# Patient Record
Sex: MALE | Race: WHITE | NOT HISPANIC OR LATINO | Employment: FULL TIME | URBAN - METROPOLITAN AREA
[De-identification: names, ages, dates, MRNs, and addresses within clinical notes are randomized per-mention and may not be internally consistent; named-entity substitution may affect disease eponyms.]

---

## 2023-09-15 ENCOUNTER — HOSPITAL ENCOUNTER (OUTPATIENT)
Facility: CLINIC | Age: 43
Setting detail: OUTPATIENT SURGERY
End: 2023-09-15
Attending: FAMILY MEDICINE | Admitting: FAMILY MEDICINE
Payer: COMMERCIAL

## 2023-10-26 RX ORDER — CLONAZEPAM 0.5 MG/1
0.5 TABLET ORAL 2 TIMES DAILY PRN
COMMUNITY
Start: 2023-10-15

## 2023-10-27 ENCOUNTER — HOSPITAL ENCOUNTER (OUTPATIENT)
Facility: CLINIC | Age: 43
Setting detail: OUTPATIENT SURGERY
Discharge: HOME | End: 2023-10-27
Attending: FAMILY MEDICINE | Admitting: FAMILY MEDICINE
Payer: COMMERCIAL

## 2023-10-27 VITALS
TEMPERATURE: 96.8 F | HEIGHT: 69 IN | OXYGEN SATURATION: 95 % | WEIGHT: 172.84 LBS | SYSTOLIC BLOOD PRESSURE: 128 MMHG | RESPIRATION RATE: 16 BRPM | DIASTOLIC BLOOD PRESSURE: 70 MMHG | BODY MASS INDEX: 25.6 KG/M2 | HEART RATE: 61 BPM

## 2023-10-27 DIAGNOSIS — M77.11 LATERAL EPICONDYLITIS, RIGHT ELBOW: Primary | ICD-10-CM

## 2023-10-27 PROCEDURE — 76942 ECHO GUIDE FOR BIOPSY: CPT | Performed by: FAMILY MEDICINE

## 2023-10-27 PROCEDURE — 7100000009 HC PHASE TWO TIME - INITIAL BASE CHARGE: Performed by: FAMILY MEDICINE

## 2023-10-27 PROCEDURE — 2720000007 HC OR 272 NO HCPCS: Performed by: FAMILY MEDICINE

## 2023-10-27 PROCEDURE — 3600000003 HC OR TIME - INITIAL BASE CHARGE - PROCEDURE LEVEL THREE: Performed by: FAMILY MEDICINE

## 2023-10-27 PROCEDURE — 3600000008 HC OR TIME - EACH INCREMENTAL 1 MINUTE - PROCEDURE LEVEL THREE: Performed by: FAMILY MEDICINE

## 2023-10-27 PROCEDURE — 2500000005 HC RX 250 GENERAL PHARMACY W/O HCPCS: Performed by: FAMILY MEDICINE

## 2023-10-27 PROCEDURE — 7100000010 HC PHASE TWO TIME - EACH INCREMENTAL 1 MINUTE: Performed by: FAMILY MEDICINE

## 2023-10-27 PROCEDURE — 24357 REPAIR ELBOW PERC: CPT | Performed by: FAMILY MEDICINE

## 2023-10-27 PROCEDURE — 2500000004 HC RX 250 GENERAL PHARMACY W/ HCPCS (ALT 636 FOR OP/ED): Performed by: FAMILY MEDICINE

## 2023-10-27 PROCEDURE — A4217 STERILE WATER/SALINE, 500 ML: HCPCS | Performed by: FAMILY MEDICINE

## 2023-10-27 RX ORDER — SODIUM CHLORIDE 0.9 G/100ML
IRRIGANT IRRIGATION AS NEEDED
Status: DISCONTINUED | OUTPATIENT
Start: 2023-10-27 | End: 2023-10-27 | Stop reason: HOSPADM

## 2023-10-27 RX ORDER — BUPIVACAINE HCL/EPINEPHRINE 0.25-.0005
VIAL (ML) INJECTION AS NEEDED
Status: DISCONTINUED | OUTPATIENT
Start: 2023-10-27 | End: 2023-10-27 | Stop reason: HOSPADM

## 2023-10-27 ASSESSMENT — PAIN SCALES - GENERAL
PAINLEVEL_OUTOF10: 4
PAINLEVEL_OUTOF10: 0 - NO PAIN

## 2023-10-27 ASSESSMENT — COLUMBIA-SUICIDE SEVERITY RATING SCALE - C-SSRS: 1. IN THE PAST MONTH, HAVE YOU WISHED YOU WERE DEAD OR WISHED YOU COULD GO TO SLEEP AND NOT WAKE UP?: NO

## 2023-10-27 ASSESSMENT — PAIN - FUNCTIONAL ASSESSMENT
PAIN_FUNCTIONAL_ASSESSMENT: 0-10
PAIN_FUNCTIONAL_ASSESSMENT: 0-10

## 2023-10-27 NOTE — OP NOTE
ULTRASOUND GUIDED PERCUTANEOUS NEEDLE TENOTOMY OF THE RIGHT ELBOW (R) Operative Note     Date: 10/27/2023  OR Location: [facility] OR    Name: Cash Segura, : 1980, Age: 43 y.o., MRN: [id number], Sex: male    Diagnosis  Pre-op Diagnosis     * Lateral epicondylitis, right elbow [M77.11] Post-op Diagnosis     * Lateral epicondylitis, right elbow [M77.11]     Procedures  ULTRASOUND GUIDED PERCUTANEOUS NEEDLE TENOTOMY OF THE RIGHT ELBOW  15202 - LA TENOTOMY ELBOW LATERAL/MEDIAL PERCUTANEOUS      Surgeons      * Isma Smith - Primary    Resident/Fellow/Other Assistant:  Surgeon(s) and Role:    Procedure Summary  Anesthesia: Monitor Anesthesia Care  ASA: ASA status not filed in the log.  Anesthesia Staff: No anesthesia staff entered.  Estimated Blood Loss: less than 5mL  Intra-op Medications: * No intraprocedure medications in log *        Specimen: No specimens collected     Staff:   Circulator: Christian Grayson RN  Scrub Person: Astrid Shell         Drains and/or Catheters: * None in log *    Tourniquet Times:     NA    Implants:     Findings: Chronic right common extensor tendonitis    Indications: Cash Segura is an 43 y.o. male who is having surgery for Lateral epicondylitis, right elbow [M77.11].     The patient was seen in the preoperative area. The risks, benefits, complications, treatment options, non-operative alternatives, expected recovery and outcomes were discussed with the patient. The possibilities of reaction to medication, pulmonary aspiration, injury to surrounding structures, bleeding, recurrent infection, the need for additional procedures, failure to diagnose a condition, and creating a complication requiring transfusion or operation were discussed with the patient. The patient concurred with the proposed plan, giving informed consent.  The site of surgery was properly noted/marked if necessary per policy. The patient has been actively warmed in preoperative area. Preoperative antibiotics  are not indicated. Venous thrombosis prophylaxis are not indicated.    Procedure Details: PATIENT NAME: NAME@     MEDICAL RECORD NUMBER: MRN@    DATE OF BIRTH: @    DATE OF PROCEDURE: TODAYTE@    PROCEDURE: Ultrasound-guided percutaneous needle tenotomy of the right common extensor tendon    ATTENDING PHYSICIAN: Dr. Isma Smith    ASSISTANT:   Dr. Eddy Griffiths D.O.    PRE-PROCEDURE DIAGNOSIS: Same    POST-PROCEDURE DIAGNOSIS:  Diagnosis unchanged    ANESTHESIA: Twilight and local    ESTIMATED BLOOD LOSS:  Minimal    COMPLICATIONS:  None    FINDINGS: Chronic right common extensor tendinitis    CONSENT:  Today's procedure, its potential benefits as well as its risks and potential side effects were reviewed.  Discussed risks of the procedure include bleeding, infection, nerve irritation or damage, reactions medications administered, headache, failure of the pain to improve, and exacerbation of the pain.  These risks were explained to the patient, who verbalized understanding and who wished to proceed.  Informed consent was signed.    DESCRIPTION OF THE PROCEDURE: Percutaneous needle tenotomy of the right common extensor tendon using ultrasound guidance cut and remove the patient's pathologic tendon.     Description of the procedure-prior to bringing the patient to operating room 3 at the [facility] in the preoperative instructions and consent was signed. The area of maximal tenderness was marked and the right elbow was identified and marked. Once in the operating room a huddle was performed and the patient's, body part, and side was confirmed. The patient was placed in the supine position. A sterile sleeve was placed over the ultrasound transducer, the anatomy was identified and the diseased tissue was visualized and confirmed near the origin of the common extensor tendon. The area is prepped with an antimicrobial solution and injected with 5 mL of local anesthetic using a 23-gauge needle. The  skin was placed the involved tissue was anesthetized. An 11 blade was used to incise through the skin wheal about 1.5 cm distal to the site of maximal tenderness. The blade continued through the subcutaneous tissue and incised the tendon. To section the right common extensor tendon the surgical instrument is introduced through the incision and advancing into the diseased tendon. Once tip of the instrument was confirmed within the pathologic tissue, the foot pedal was depressed and the tendon was incised along its length cutting and removing the diseased tendon tissue. The total cutting time was 2:37 on medium intensity. Following the procedure, a Steri-Strip was placed followed by gauze, a Tegaderm, and a compressive Ace bandage.  Ultrasound images were obtained throughout the procedure and stored for review at a later time if needed.  The patient was then taken to the recovery room in good condition where postoperative instructions were given.    Complications:  None; patient tolerated the procedure well.    Disposition: PACU - hemodynamically stable.  Condition: stable         Additional Details: none    Attending Attestation: I was present and scrubbed for the entire procedure.    Isma Smith  Phone Number: [phone]

## 2023-10-27 NOTE — H&P
"History Of Present Illness  Cash Segura is a 43 y.o. male presenting with chronic common extensor tendonitis of the right elbow.  He has been dealing with this for some time and has failed conservative treatment with activity modification, oral medications, and formal physical therapy.     Past Medical History  Past Medical History:   Diagnosis Date    Anxiety disorder, unspecified     Anxiety    Elbow tendonitis     Unspecified asthma, uncomplicated     Childhood asthma    Unspecified tear of unspecified meniscus, current injury, right knee, initial encounter 02/14/2020    Tear of right meniscus as current injury       Surgical History  Past Surgical History:   Procedure Laterality Date    OTHER SURGICAL HISTORY  06/05/2013    Ear Surgery    TONSILLECTOMY  06/05/2013    Tonsillectomy        Social History  He reports that he has never smoked. He has never used smokeless tobacco. He reports that he does not use drugs. No history on file for alcohol use.    Family History  No family history on file.     Allergies  Patient has no known allergies.    Review of Systems     Msk: right elbow-no deformity or asymmetry.  He has tenderness to palpation over the lateral epicondyle.  He has pain with resisted wrist extension and middle finger extension.  Resisted forearm supination also recreates his discomfort.  He is got full range of motion of the wrist and the elbow.  He has mild pain with strength testing at the elbow.    Cardiovascular: Normal exam.  Pulmonary: Normal exam.       Last Recorded Vitals  Pulse 68, temperature 36.5 °C (97.7 °F), temperature source Temporal, resp. rate 16, height 1.753 m (5' 9\"), weight 78.4 kg (172 lb 13.5 oz), SpO2 95 %.    Relevant Results        Reviewed the ultrasound of the right lateral elbow consistent with chronic common extensor tendinitis of the right elbow.     Assessment/Plan   Active Problems:    Right tennis elbow      May get a low-grade before that.             Isma VU" MD Luis

## 2023-10-30 ASSESSMENT — PAIN SCALES - GENERAL: PAINLEVEL_OUTOF10: 1

## 2023-11-02 PROBLEM — E78.00 PURE HYPERCHOLESTEROLEMIA: Status: ACTIVE | Noted: 2019-07-15

## 2023-11-02 PROBLEM — D22.39 MELANOCYTIC NEVI OF OTHER PARTS OF FACE: Status: ACTIVE | Noted: 2020-07-10

## 2023-11-02 PROBLEM — L57.9 SKIN CHANGES DUE TO CHRONIC EXPOSURE TO NONIONIZING RADIATION: Status: ACTIVE | Noted: 2020-07-10

## 2023-11-02 PROBLEM — B07.9 VIRAL WART, UNSPECIFIED: Status: ACTIVE | Noted: 2020-07-10

## 2023-11-02 PROBLEM — L72.11 PILAR CYST: Status: ACTIVE | Noted: 2020-07-10

## 2023-11-02 PROBLEM — E03.9 HYPOTHYROIDISM: Status: ACTIVE | Noted: 2023-11-02

## 2023-11-02 PROBLEM — H52.13 BILATERAL MYOPIA: Status: ACTIVE | Noted: 2023-11-02

## 2023-11-02 PROBLEM — H66.90 OTITIS MEDIA: Status: ACTIVE | Noted: 2023-11-02

## 2023-11-02 PROBLEM — D48.5 NEOPLASM OF UNCERTAIN BEHAVIOR OF SKIN: Status: ACTIVE | Noted: 2020-07-10

## 2023-11-02 PROBLEM — M22.41 CHONDROMALACIA OF RIGHT PATELLA: Status: ACTIVE | Noted: 2023-11-02

## 2023-11-02 PROBLEM — L91.8 OTHER HYPERTROPHIC DISORDERS OF THE SKIN: Status: ACTIVE | Noted: 2020-07-10

## 2023-11-02 PROBLEM — L90.5 SCAR CONDITION AND FIBROSIS OF SKIN: Status: ACTIVE | Noted: 2020-07-10

## 2023-11-02 PROBLEM — F41.9 ANXIETY: Status: ACTIVE | Noted: 2018-12-27

## 2023-11-02 PROBLEM — L81.4 OTHER MELANIN HYPERPIGMENTATION: Status: ACTIVE | Noted: 2020-07-10

## 2023-11-02 PROBLEM — D22.60 MELANOCYTIC NEVI OF UNSPECIFIED UPPER LIMB, INCLUDING SHOULDER: Status: ACTIVE | Noted: 2020-07-10

## 2023-11-02 PROBLEM — S59.901A INJURY OF ELBOW, RIGHT: Status: ACTIVE | Noted: 2023-11-02

## 2023-11-02 PROBLEM — D22.70 MELANOCYTIC NEVI OF UNSPECIFIED LOWER LIMB, INCLUDING HIP: Status: ACTIVE | Noted: 2020-07-10

## 2023-11-02 PROBLEM — D22.5 MELANOCYTIC NEVI OF TRUNK: Status: ACTIVE | Noted: 2020-07-10

## 2023-11-02 RX ORDER — METRONIDAZOLE 7.5 MG/G
GEL TOPICAL 2 TIMES DAILY
COMMUNITY
Start: 2023-03-08

## 2023-11-02 RX ORDER — MUPIROCIN 20 MG/G
OINTMENT TOPICAL DAILY
COMMUNITY
Start: 2023-01-03

## 2023-11-02 RX ORDER — HYDROCORTISONE 25 MG/G
CREAM TOPICAL
COMMUNITY
Start: 2023-01-26

## 2023-11-02 RX ORDER — DICLOFENAC SODIUM 75 MG/1
1 TABLET, DELAYED RELEASE ORAL 2 TIMES DAILY
COMMUNITY
Start: 2017-09-29

## 2023-11-02 RX ORDER — FLUTICASONE PROPIONATE 50 MCG
2 SPRAY, SUSPENSION (ML) NASAL DAILY
COMMUNITY
Start: 2023-03-30

## 2023-11-02 RX ORDER — OXYCODONE AND ACETAMINOPHEN 5; 325 MG/1; MG/1
1-2 TABLET ORAL EVERY 6 HOURS PRN
COMMUNITY
Start: 2020-05-08

## 2023-11-03 ENCOUNTER — TELEMEDICINE (OUTPATIENT)
Dept: ORTHOPEDIC SURGERY | Facility: CLINIC | Age: 43
End: 2023-11-03
Payer: COMMERCIAL

## 2023-11-03 DIAGNOSIS — M77.11 RIGHT TENNIS ELBOW: Primary | ICD-10-CM

## 2023-11-03 PROCEDURE — 99024 POSTOP FOLLOW-UP VISIT: CPT | Performed by: FAMILY MEDICINE

## 2023-11-07 NOTE — PROGRESS NOTES
1 week follow-up after ultrasound-guided percutaneous needle tenotomy of the right common extensor tendon of the elbow.    Subjective    Patient ID: Cash Segura is a 43 y.o. male.    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 He has had multiple lateral elbow injections which initially helped, but now are wearing off fairly quickly. He was referred here by Dr. Woods, please see his notes in chart for complete details and treatment course. His last corticosteroid injection was on 11/22/2022. He cannot remember any specific injury or trauma to the elbow. When asked to point where the pain is, he points to the lateral epicondyle. He states it is a sharp pain with movement and achy at rest. It is making it hard for him to . He denies any radiating pain.     11/3/2023: Cash returns virtually for his first postprocedure visit.  He is 1 week out and doing well.  He does have some stiffness and discomfort with certain movements.  He denies any new injury or trauma to the elbow.  He has had no issues with the incision.  He has been compliant with his restrictions.    Objective   This was a virtual visit with both video and audio capabilities.  The video capabilities were utilized to visualize the incision and there was no redness or drainage.    Image Results: None obtained today.      Assessment/Plan   Encounter Diagnoses:  Right tennis elbow  He is doing well and healing appropriately.  I gave him some exercises to do to work on strength and range of motion.  He should be focusing on eccentric exercises and range of motion activities.  At this point he should lift nothing heavier than 5 pounds with the right arm.  He will follow-up in 5 weeks at the 6-week  isaura for an in person exam and ultrasound evaluation if needed.  He can continue with his current medication regimen.  All of his questions were answered and he agrees with the treatment plan.    This was a virtual visit with both video and audio capabilities.  I spent 7 minutes with him and greater than 50% of the time was spent discussing his diagnosis, prognosis, and treatment course.    ** Please excuse any errors in grammar or translation related to this dictation. Voice recognition software was utilized to prepare this document. **    Isma Smith M.D.  Clinical , Division of Sports Medicine  Primary Care Sports Medicine  Department of Orthopedic Surgery  [facility] [place]

## 2023-11-16 DIAGNOSIS — M77.11 RIGHT TENNIS ELBOW: ICD-10-CM

## 2023-12-08 ENCOUNTER — APPOINTMENT (OUTPATIENT)
Dept: ORTHOPEDIC SURGERY | Facility: CLINIC | Age: 43
End: 2023-12-08
Payer: COMMERCIAL

## 2024-10-21 ENCOUNTER — APPOINTMENT (OUTPATIENT)
Dept: ORTHOPEDIC SURGERY | Facility: HOSPITAL | Age: 44
End: 2024-10-21
Payer: COMMERCIAL

## 2024-10-30 ENCOUNTER — APPOINTMENT (OUTPATIENT)
Dept: RADIOLOGY | Facility: HOSPITAL | Age: 44
End: 2024-10-30
Payer: COMMERCIAL

## 2024-10-30 ENCOUNTER — CLINICAL SUPPORT (OUTPATIENT)
Dept: EMERGENCY MEDICINE | Facility: HOSPITAL | Age: 44
End: 2024-10-30
Payer: COMMERCIAL

## 2024-10-30 ENCOUNTER — HOSPITAL ENCOUNTER (EMERGENCY)
Facility: HOSPITAL | Age: 44
Discharge: HOME | End: 2024-10-30
Payer: COMMERCIAL

## 2024-10-30 VITALS
HEART RATE: 99 BPM | HEIGHT: 69 IN | BODY MASS INDEX: 24.29 KG/M2 | OXYGEN SATURATION: 98 % | TEMPERATURE: 97.2 F | DIASTOLIC BLOOD PRESSURE: 100 MMHG | RESPIRATION RATE: 17 BRPM | WEIGHT: 164 LBS | SYSTOLIC BLOOD PRESSURE: 157 MMHG

## 2024-10-30 DIAGNOSIS — R42 EPISODIC LIGHTHEADEDNESS: ICD-10-CM

## 2024-10-30 DIAGNOSIS — R07.9 CHEST PAIN, UNSPECIFIED TYPE: Primary | ICD-10-CM

## 2024-10-30 LAB
ALBUMIN SERPL BCP-MCNC: 4.7 G/DL (ref 3.4–5)
ALP SERPL-CCNC: 61 U/L (ref 33–120)
ALT SERPL W P-5'-P-CCNC: 10 U/L (ref 10–52)
ANION GAP SERPL CALC-SCNC: 19 MMOL/L (ref 10–20)
AST SERPL W P-5'-P-CCNC: 18 U/L (ref 9–39)
ATRIAL RATE: 100 BPM
BASOPHILS # BLD AUTO: 0.05 X10*3/UL (ref 0–0.1)
BASOPHILS NFR BLD AUTO: 0.6 %
BILIRUB SERPL-MCNC: 0.8 MG/DL (ref 0–1.2)
BUN SERPL-MCNC: 19 MG/DL (ref 6–23)
CALCIUM SERPL-MCNC: 10 MG/DL (ref 8.6–10.6)
CARDIAC TROPONIN I PNL SERPL HS: 4 NG/L (ref 0–53)
CARDIAC TROPONIN I PNL SERPL HS: <3 NG/L (ref 0–53)
CHLORIDE SERPL-SCNC: 99 MMOL/L (ref 98–107)
CO2 SERPL-SCNC: 22 MMOL/L (ref 21–32)
CREAT SERPL-MCNC: 1.03 MG/DL (ref 0.5–1.3)
D DIMER PPP FEU-MCNC: <215 NG/ML FEU
EGFRCR SERPLBLD CKD-EPI 2021: >90 ML/MIN/1.73M*2
EOSINOPHIL # BLD AUTO: 0.14 X10*3/UL (ref 0–0.7)
EOSINOPHIL NFR BLD AUTO: 1.5 %
ERYTHROCYTE [DISTWIDTH] IN BLOOD BY AUTOMATED COUNT: 12.6 % (ref 11.5–14.5)
GLUCOSE SERPL-MCNC: 91 MG/DL (ref 74–99)
HCT VFR BLD AUTO: 47.2 % (ref 41–52)
HGB BLD-MCNC: 16.1 G/DL (ref 13.5–17.5)
IMM GRANULOCYTES # BLD AUTO: 0.02 X10*3/UL (ref 0–0.7)
IMM GRANULOCYTES NFR BLD AUTO: 0.2 % (ref 0–0.9)
LYMPHOCYTES # BLD AUTO: 1.87 X10*3/UL (ref 1.2–4.8)
LYMPHOCYTES NFR BLD AUTO: 20.6 %
MAGNESIUM SERPL-MCNC: 2.15 MG/DL (ref 1.6–2.4)
MCH RBC QN AUTO: 28.5 PG (ref 26–34)
MCHC RBC AUTO-ENTMCNC: 34.1 G/DL (ref 32–36)
MCV RBC AUTO: 84 FL (ref 80–100)
MONOCYTES # BLD AUTO: 0.88 X10*3/UL (ref 0.1–1)
MONOCYTES NFR BLD AUTO: 9.7 %
NEUTROPHILS # BLD AUTO: 6.1 X10*3/UL (ref 1.2–7.7)
NEUTROPHILS NFR BLD AUTO: 67.4 %
NRBC BLD-RTO: 0 /100 WBCS (ref 0–0)
P AXIS: 69 DEGREES
P OFFSET: 214 MS
P ONSET: 152 MS
PLATELET # BLD AUTO: 235 X10*3/UL (ref 150–450)
POTASSIUM SERPL-SCNC: 3.7 MMOL/L (ref 3.5–5.3)
PR INTERVAL: 136 MS
PROT SERPL-MCNC: 7.6 G/DL (ref 6.4–8.2)
Q ONSET: 220 MS
QRS COUNT: 17 BEATS
QRS DURATION: 90 MS
QT INTERVAL: 358 MS
QTC CALCULATION(BAZETT): 461 MS
QTC FREDERICIA: 424 MS
R AXIS: 60 DEGREES
RBC # BLD AUTO: 5.64 X10*6/UL (ref 4.5–5.9)
SODIUM SERPL-SCNC: 136 MMOL/L (ref 136–145)
T AXIS: 22 DEGREES
T OFFSET: 399 MS
T4 FREE SERPL-MCNC: 1.19 NG/DL (ref 0.78–1.48)
TSH SERPL-ACNC: 4.8 MIU/L (ref 0.44–3.98)
VENTRICULAR RATE: 100 BPM
WBC # BLD AUTO: 9.1 X10*3/UL (ref 4.4–11.3)

## 2024-10-30 PROCEDURE — 84484 ASSAY OF TROPONIN QUANT: CPT

## 2024-10-30 PROCEDURE — 36415 COLL VENOUS BLD VENIPUNCTURE: CPT

## 2024-10-30 PROCEDURE — 85025 COMPLETE CBC W/AUTO DIFF WBC: CPT

## 2024-10-30 PROCEDURE — 84439 ASSAY OF FREE THYROXINE: CPT

## 2024-10-30 PROCEDURE — 93005 ELECTROCARDIOGRAM TRACING: CPT

## 2024-10-30 PROCEDURE — 99283 EMERGENCY DEPT VISIT LOW MDM: CPT | Mod: 25

## 2024-10-30 PROCEDURE — 83735 ASSAY OF MAGNESIUM: CPT

## 2024-10-30 PROCEDURE — 71046 X-RAY EXAM CHEST 2 VIEWS: CPT

## 2024-10-30 PROCEDURE — 80053 COMPREHEN METABOLIC PANEL: CPT

## 2024-10-30 PROCEDURE — 85379 FIBRIN DEGRADATION QUANT: CPT

## 2024-10-30 PROCEDURE — 71046 X-RAY EXAM CHEST 2 VIEWS: CPT | Performed by: STUDENT IN AN ORGANIZED HEALTH CARE EDUCATION/TRAINING PROGRAM

## 2024-10-30 PROCEDURE — 84443 ASSAY THYROID STIM HORMONE: CPT

## 2024-10-30 ASSESSMENT — PAIN - FUNCTIONAL ASSESSMENT: PAIN_FUNCTIONAL_ASSESSMENT: 0-10

## 2024-10-30 ASSESSMENT — COLUMBIA-SUICIDE SEVERITY RATING SCALE - C-SSRS
1. IN THE PAST MONTH, HAVE YOU WISHED YOU WERE DEAD OR WISHED YOU COULD GO TO SLEEP AND NOT WAKE UP?: NO
6. HAVE YOU EVER DONE ANYTHING, STARTED TO DO ANYTHING, OR PREPARED TO DO ANYTHING TO END YOUR LIFE?: NO
2. HAVE YOU ACTUALLY HAD ANY THOUGHTS OF KILLING YOURSELF?: NO

## 2024-10-30 ASSESSMENT — PAIN SCALES - GENERAL: PAINLEVEL_OUTOF10: 3

## 2024-10-30 ASSESSMENT — PAIN DESCRIPTION - PAIN TYPE: TYPE: ACUTE PAIN

## 2024-10-30 ASSESSMENT — PAIN DESCRIPTION - LOCATION: LOCATION: CHEST

## 2024-11-04 PROBLEM — R07.9 CHEST PAIN, UNSPECIFIED: Status: ACTIVE | Noted: 2024-11-04

## 2024-11-05 ENCOUNTER — HOSPITAL ENCOUNTER (OUTPATIENT)
Dept: CARDIOLOGY | Facility: CLINIC | Age: 44
Discharge: HOME | End: 2024-11-05
Payer: COMMERCIAL

## 2024-11-05 ENCOUNTER — OFFICE VISIT (OUTPATIENT)
Dept: CARDIOLOGY | Facility: CLINIC | Age: 44
End: 2024-11-05
Payer: COMMERCIAL

## 2024-11-05 VITALS
WEIGHT: 171.38 LBS | HEIGHT: 69 IN | DIASTOLIC BLOOD PRESSURE: 67 MMHG | OXYGEN SATURATION: 95 % | HEART RATE: 63 BPM | SYSTOLIC BLOOD PRESSURE: 113 MMHG | BODY MASS INDEX: 25.38 KG/M2

## 2024-11-05 DIAGNOSIS — R00.2 PALPITATIONS: ICD-10-CM

## 2024-11-05 DIAGNOSIS — R42 DIZZINESS: ICD-10-CM

## 2024-11-05 DIAGNOSIS — R07.9 CHEST PAIN, UNSPECIFIED TYPE: Primary | ICD-10-CM

## 2024-11-05 LAB — BODY SURFACE AREA: 1.95 M2

## 2024-11-05 PROCEDURE — 3008F BODY MASS INDEX DOCD: CPT | Performed by: INTERNAL MEDICINE

## 2024-11-05 PROCEDURE — 93005 ELECTROCARDIOGRAM TRACING: CPT | Performed by: INTERNAL MEDICINE

## 2024-11-05 PROCEDURE — 93246 EXT ECG>7D<15D RECORDING: CPT

## 2024-11-05 PROCEDURE — 99204 OFFICE O/P NEW MOD 45 MIN: CPT | Performed by: INTERNAL MEDICINE

## 2024-11-05 PROCEDURE — 99214 OFFICE O/P EST MOD 30 MIN: CPT | Performed by: INTERNAL MEDICINE

## 2024-11-05 ASSESSMENT — PAIN SCALES - GENERAL: PAINLEVEL_OUTOF10: 0-NO PAIN

## 2024-11-05 ASSESSMENT — ENCOUNTER SYMPTOMS
DEPRESSION: 0
LOSS OF SENSATION IN FEET: 0
OCCASIONAL FEELINGS OF UNSTEADINESS: 0

## 2024-11-05 ASSESSMENT — COLUMBIA-SUICIDE SEVERITY RATING SCALE - C-SSRS
2. HAVE YOU ACTUALLY HAD ANY THOUGHTS OF KILLING YOURSELF?: NO
6. HAVE YOU EVER DONE ANYTHING, STARTED TO DO ANYTHING, OR PREPARED TO DO ANYTHING TO END YOUR LIFE?: NO
1. IN THE PAST MONTH, HAVE YOU WISHED YOU WERE DEAD OR WISHED YOU COULD GO TO SLEEP AND NOT WAKE UP?: NO

## 2024-11-05 NOTE — PROGRESS NOTES
Subjective   Cash Segura is a 44 y.o. male.    Past medical history  Nonsignificant  Sometimes uses as needed clonazepam for anxiety    Family history:  No history of CAD in family    Smoking:  Nonsignificant history.  Smoked during his college years        Chief Complaint:  No chief complaint on file.    HPI  For the past 2 months patient is having chest pain lasting from few minutes to hours-whole day.  Patient described the chest pain as sharp.  No radiation to arm or back.  No relation to physical exertion.  Patient came to exercise while examining the chest pain without worsening.  Chest pain is not related to food habits.  Chest pain is mild in intensity.  Chest pain relieved by itself  with no particular relieving factor.  Chest pain gets worse when patient leans forward or takes deep breath.  Patient also had a couple of episodes of lightheadedness 1 while he was having chest pain and the second today while patient was having palpitation.  No history of syncope.  Patient 2 episodes of palpitation today 1 in the morning that lasted for an hour.  He took lorazepam thinking it was anxiety but without any significant help.  The second episode was later in the afternoon.  During the morning when patient had palpitation he was also lightheaded.  No lower extremity edema.  No recent history of flu      ROS  No significant complaints    Objective   Constitutional:       Appearance: Healthy appearance.   Neck:      Vascular: JVD normal.   Pulmonary:      Effort: Pulmonary effort is normal.      Breath sounds: Normal breath sounds.   Cardiovascular:      PMI at left midclavicular line. Normal rate. Regular rhythm. Normal S1. Normal S2.       Murmurs: There is no murmur.   Edema:     Peripheral edema absent.   Abdominal:      General: Bowel sounds are normal.      Palpations: Abdomen is soft.   Skin:     General: Skin is warm and dry.   Neurological:      General: No focal deficit present.           Lab Review:   Lab  "Results   Component Value Date     10/30/2024    K 3.7 10/30/2024    CL 99 10/30/2024    CO2 22 10/30/2024    BUN 19 10/30/2024    CREATININE 1.03 10/30/2024    GLUCOSE 91 10/30/2024    CALCIUM 10.0 10/30/2024     Lab Results   Component Value Date    WBC 9.1 10/30/2024    HGB 16.1 10/30/2024    HCT 47.2 10/30/2024    MCV 84 10/30/2024     10/30/2024     No results found for: \"CHOL\", \"TRIG\", \"HDL\", \"LDLDIRECT\"    Assessment/Plan   The encounter diagnosis was Chest pain, unspecified type.  44-year-old female is having intermittent chest pain for the past 2 months.  Patient had a ER visit last week because of this chest pain where AMI was ruled out.  Patient is very worried about chest pain.  No significant known risk factors.  Chest pain is atypical in description.  Patient also complains of potation and episodes of lightheadedness    Chest pain:  -Over the past 2 months, very atypical in description  -No significant risk factors  -Patient is very concerned and already had 1 ER visit in connection to it  -To rule out any significant CAD, ordering a cardiac CT  -Patient is overall at overall low risk and according to guidelines CT is indicated      Palpitations/lightheaded episode  -Recent history  -Ordering 2 weeks of heart monitor  -Ordering an echocardiogram to rule out any structural heart disease or any pericarditis which can explain both palpitation as well as chest pain      Follow-up after all the investigations  "

## 2024-11-11 ENCOUNTER — APPOINTMENT (OUTPATIENT)
Dept: CARDIOLOGY | Facility: HOSPITAL | Age: 44
End: 2024-11-11
Payer: COMMERCIAL

## 2024-11-19 ENCOUNTER — HOSPITAL ENCOUNTER (OUTPATIENT)
Dept: CARDIOLOGY | Facility: CLINIC | Age: 44
Discharge: HOME | End: 2024-11-19
Payer: COMMERCIAL

## 2024-11-19 DIAGNOSIS — I30.9 ACUTE PERICARDITIS, UNSPECIFIED (HHS-HCC): ICD-10-CM

## 2024-11-19 DIAGNOSIS — R00.2 PALPITATIONS: ICD-10-CM

## 2024-11-19 DIAGNOSIS — R42 DIZZINESS: ICD-10-CM

## 2024-11-19 LAB
AORTIC VALVE MEAN GRADIENT: 3 MMHG
AORTIC VALVE PEAK VELOCITY: 1.09 M/S
AV PEAK GRADIENT: 5 MMHG
AVA (PEAK VEL): 2.71 CM2
AVA (VTI): 2.57 CM2
EJECTION FRACTION APICAL 4 CHAMBER: 64.5
EJECTION FRACTION: 63 %
LEFT ATRIUM VOLUME AREA LENGTH INDEX BSA: 37.6 ML/M2
LEFT VENTRICLE INTERNAL DIMENSION DIASTOLE: 5.4 CM (ref 3.5–6)
LEFT VENTRICULAR OUTFLOW TRACT DIAMETER: 2.03 CM
MITRAL VALVE E/A RATIO: 1.31
RIGHT VENTRICLE FREE WALL PEAK S': 11 CM/S
RIGHT VENTRICLE PEAK SYSTOLIC PRESSURE: 22.1 MMHG
TRICUSPID ANNULAR PLANE SYSTOLIC EXCURSION: 2.1 CM

## 2024-11-19 PROCEDURE — 93306 TTE W/DOPPLER COMPLETE: CPT | Performed by: INTERNAL MEDICINE

## 2024-11-19 PROCEDURE — 93306 TTE W/DOPPLER COMPLETE: CPT

## 2024-11-27 ENCOUNTER — APPOINTMENT (OUTPATIENT)
Dept: ORTHOPEDIC SURGERY | Facility: HOSPITAL | Age: 44
End: 2024-11-27
Payer: COMMERCIAL

## 2024-11-27 ENCOUNTER — HOSPITAL ENCOUNTER (OUTPATIENT)
Dept: RADIOLOGY | Facility: HOSPITAL | Age: 44
Discharge: HOME | End: 2024-11-27
Payer: COMMERCIAL

## 2024-11-27 VITALS — SYSTOLIC BLOOD PRESSURE: 124 MMHG | DIASTOLIC BLOOD PRESSURE: 65 MMHG | OXYGEN SATURATION: 100 % | HEART RATE: 62 BPM

## 2024-11-27 DIAGNOSIS — R07.9 CHEST PAIN, UNSPECIFIED TYPE: ICD-10-CM

## 2024-11-27 LAB — BODY SURFACE AREA: 1.95 M2

## 2024-11-27 PROCEDURE — 2550000001 HC RX 255 CONTRASTS: Performed by: INTERNAL MEDICINE

## 2024-11-27 PROCEDURE — 2500000004 HC RX 250 GENERAL PHARMACY W/ HCPCS (ALT 636 FOR OP/ED): Performed by: RADIOLOGY

## 2024-11-27 PROCEDURE — 75574 CT ANGIO HRT W/3D IMAGE: CPT

## 2024-11-27 PROCEDURE — 75574 CT ANGIO HRT W/3D IMAGE: CPT | Performed by: RADIOLOGY

## 2024-11-27 PROCEDURE — 2500000001 HC RX 250 WO HCPCS SELF ADMINISTERED DRUGS (ALT 637 FOR MEDICARE OP): Performed by: RADIOLOGY

## 2024-11-27 RX ORDER — METOPROLOL TARTRATE 100 MG/1
100 TABLET ORAL ONCE
Status: DISCONTINUED | OUTPATIENT
Start: 2024-11-27 | End: 2024-11-28 | Stop reason: HOSPADM

## 2024-11-27 RX ORDER — LORAZEPAM 2 MG/ML
0.5 INJECTION INTRAMUSCULAR EVERY 5 MIN PRN
Status: DISCONTINUED | OUTPATIENT
Start: 2024-11-27 | End: 2024-11-28 | Stop reason: HOSPADM

## 2024-11-27 RX ORDER — METOPROLOL TARTRATE 1 MG/ML
5 INJECTION, SOLUTION INTRAVENOUS ONCE AS NEEDED
Status: DISCONTINUED | OUTPATIENT
Start: 2024-11-27 | End: 2024-11-28 | Stop reason: HOSPADM

## 2024-11-27 RX ORDER — NITROGLYCERIN 0.4 MG/1
0.8 TABLET SUBLINGUAL ONCE
Status: COMPLETED | OUTPATIENT
Start: 2024-11-27 | End: 2024-11-27

## 2024-11-27 RX ORDER — METOPROLOL TARTRATE 100 MG/1
100 TABLET ORAL ONCE AS NEEDED
Status: DISCONTINUED | OUTPATIENT
Start: 2024-11-27 | End: 2024-11-28 | Stop reason: HOSPADM

## 2024-11-27 RX ORDER — METOPROLOL TARTRATE 1 MG/ML
5 INJECTION, SOLUTION INTRAVENOUS ONCE AS NEEDED
Status: COMPLETED | OUTPATIENT
Start: 2024-11-27 | End: 2024-11-27

## 2024-11-27 RX ORDER — METOPROLOL TARTRATE 1 MG/ML
5 INJECTION, SOLUTION INTRAVENOUS ONCE
Status: COMPLETED | OUTPATIENT
Start: 2024-11-27 | End: 2024-11-27

## 2024-11-27 ASSESSMENT — PAIN - FUNCTIONAL ASSESSMENT: PAIN_FUNCTIONAL_ASSESSMENT: 0-10

## 2024-11-27 ASSESSMENT — PAIN SCALES - GENERAL: PAINLEVEL_OUTOF10: 0 - NO PAIN

## 2024-12-06 NOTE — PROGRESS NOTES
Tried to call patient with the results.  No response.  I have messaged patient regarding the results of the test.    He has known coronary artery disease with a calcium score of 0  Echo shows mild mitral valve prolapse without any significant regurg.  Heart monitor with a single episode of nonsustained VT.  Patient is advised to to see a cardiologist annually.    If he gets syncope or his palpitation gets worse, he is advised to see a cardiologist sooner

## 2025-11-04 ENCOUNTER — APPOINTMENT (OUTPATIENT)
Dept: CARDIOLOGY | Facility: HOSPITAL | Age: 45
End: 2025-11-04
Payer: COMMERCIAL

## (undated) DEVICE — MARKER, SKIN, REGULAR TIP, W/W/FLEXI RULER, LABEL

## (undated) DEVICE — DRESSING, MOISTURE VAPOR PERMEABLE, POLYSKIN II, 2 X 2.75 IN, TRANSPARENT

## (undated) DEVICE — PROCEDURE PACK, TX2

## (undated) DEVICE — TOWEL PACK, STERILE, 4/PACK, BLUE

## (undated) DEVICE — BANDAGE, ELASTIC, REINFORCED, ECONO-WRAP, 4 IN X 4.5 YD, LATEX

## (undated) DEVICE — DRAPE, SHEET, FAN FOLDED, MEDIUM, 44 X 72 IN, DISPOSABLE, LF, STERILE

## (undated) DEVICE — GLOVE, SURGICAL, PROTEXIS PI W/NEU-THERA, 7.5, PF, LF

## (undated) DEVICE — APPLICATOR, PREP, CHLORAPREP, W/ORANGE TINT, 10.5ML